# Patient Record
Sex: FEMALE
[De-identification: names, ages, dates, MRNs, and addresses within clinical notes are randomized per-mention and may not be internally consistent; named-entity substitution may affect disease eponyms.]

---

## 2019-10-11 PROBLEM — Z00.00 ENCOUNTER FOR PREVENTIVE HEALTH EXAMINATION: Status: ACTIVE | Noted: 2019-10-11

## 2019-10-14 ENCOUNTER — APPOINTMENT (OUTPATIENT)
Dept: OTOLARYNGOLOGY | Facility: CLINIC | Age: 79
End: 2019-10-14
Payer: MEDICARE

## 2019-10-14 DIAGNOSIS — Z86.79 PERSONAL HISTORY OF OTHER DISEASES OF THE CIRCULATORY SYSTEM: ICD-10-CM

## 2019-10-14 DIAGNOSIS — Z87.39 PERSONAL HISTORY OF OTHER DISEASES OF THE MUSCULOSKELETAL SYSTEM AND CONNECTIVE TISSUE: ICD-10-CM

## 2019-10-14 DIAGNOSIS — Z80.9 FAMILY HISTORY OF MALIGNANT NEOPLASM, UNSPECIFIED: ICD-10-CM

## 2019-10-14 DIAGNOSIS — H90.3 SENSORINEURAL HEARING LOSS, BILATERAL: ICD-10-CM

## 2019-10-14 PROCEDURE — 92567 TYMPANOMETRY: CPT

## 2019-10-14 PROCEDURE — G0268 REMOVAL OF IMPACTED WAX MD: CPT

## 2019-10-14 PROCEDURE — 92557 COMPREHENSIVE HEARING TEST: CPT

## 2019-10-14 PROCEDURE — 99202 OFFICE O/P NEW SF 15 MIN: CPT | Mod: 25

## 2019-10-14 RX ORDER — CHROMIUM 200 MCG
TABLET ORAL
Refills: 0 | Status: ACTIVE | COMMUNITY

## 2019-10-15 NOTE — HISTORY OF PRESENT ILLNESS
[de-identified] : This is an initial office visit for this woman who presents for evaluation of cerumen impaction. This was noticed on routine evaluation.\par She was referred in consultation.\par She also reports having an audiogram approximately one year ago. She reports that the audiologist/ told her that she was a borderline candidate for hearing aid amplification.\par \par She does intermittently have bilateral tinnitus.\par She does find that in some communicative environments especially with her  and within the ambiant noise her hearing is not as acute as it should be.

## 2019-10-15 NOTE — CONSULT LETTER
[Dear  ___] : Dear  [unfilled], [Consult Letter:] : I had the pleasure of evaluating your patient, [unfilled]. [Please see my note below.] : Please see my note below. [Sincerely,] : Sincerely, [FreeTextEntry1] : Enclosed- audiogram. [FreeTextEntry3] : Rafy Cotton MD\par [facility]- Co-Director\par [facility]

## 2019-10-15 NOTE — ASSESSMENT
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  She will have a hearing aide consultation at our hearing center.\par -The patient was asked to call and return urgently if any problems\par -I will see the patient in follow up one year.

## 2020-02-12 ENCOUNTER — APPOINTMENT (OUTPATIENT)
Dept: OTOLARYNGOLOGY | Facility: CLINIC | Age: 80
End: 2020-02-12
Payer: SELF-PAY

## 2020-02-12 PROCEDURE — SI001: CPT

## 2020-02-12 PROCEDURE — D0088: CPT

## 2020-02-12 PROCEDURE — D0090A COSMETIC BOTOX: CUSTOM

## 2020-02-12 RX ORDER — PNV NO.95/FERROUS FUM/FOLIC AC 28MG-0.8MG
TABLET ORAL
Refills: 0 | Status: ACTIVE | COMMUNITY

## 2020-02-12 RX ORDER — FUROSEMIDE 80 MG/1
TABLET ORAL
Refills: 0 | Status: ACTIVE | COMMUNITY

## 2020-02-12 RX ORDER — ENALAPRIL MALEATE 5 MG/1
TABLET ORAL
Refills: 0 | Status: ACTIVE | COMMUNITY

## 2020-02-12 RX ORDER — CHOLECALCIFEROL (VITAMIN D3) 25 MCG
TABLET ORAL
Refills: 0 | Status: ACTIVE | COMMUNITY

## 2020-02-12 RX ORDER — ASPIRIN 325 MG/1
TABLET, FILM COATED ORAL
Refills: 0 | Status: ACTIVE | COMMUNITY

## 2020-02-12 RX ORDER — BUPROPION HYDROCHLORIDE 100 MG/1
TABLET, FILM COATED ORAL
Refills: 0 | Status: ACTIVE | COMMUNITY

## 2020-02-12 RX ORDER — METOPROLOL TARTRATE 75 MG/1
TABLET, FILM COATED ORAL
Refills: 0 | Status: ACTIVE | COMMUNITY

## 2020-02-12 RX ORDER — ESCITALOPRAM OXALATE 5 MG/1
TABLET, FILM COATED ORAL
Refills: 0 | Status: ACTIVE | COMMUNITY

## 2020-02-12 RX ORDER — ATORVASTATIN CALCIUM 80 MG/1
TABLET, FILM COATED ORAL
Refills: 0 | Status: ACTIVE | COMMUNITY

## 2020-02-12 RX ORDER — TRETINOIN 0.25 MG/G
0.03 CREAM TOPICAL
Qty: 1 | Refills: 3 | Status: ACTIVE | COMMUNITY
Start: 2020-02-12 | End: 1900-01-01

## 2020-02-12 RX ORDER — CHROMIUM 200 MCG
TABLET ORAL
Refills: 0 | Status: ACTIVE | COMMUNITY

## 2021-06-15 ENCOUNTER — APPOINTMENT (OUTPATIENT)
Dept: OTOLARYNGOLOGY | Facility: CLINIC | Age: 81
End: 2021-06-15
Payer: MEDICARE

## 2021-06-15 VITALS — HEIGHT: 64 IN | WEIGHT: 143 LBS | TEMPERATURE: 97.2 F | BODY MASS INDEX: 24.41 KG/M2

## 2021-06-15 DIAGNOSIS — H90.5 UNSPECIFIED SENSORINEURAL HEARING LOSS: ICD-10-CM

## 2021-06-15 DIAGNOSIS — H61.20 IMPACTED CERUMEN, UNSPECIFIED EAR: ICD-10-CM

## 2021-06-15 PROCEDURE — 99212 OFFICE O/P EST SF 10 MIN: CPT | Mod: 25

## 2021-06-15 PROCEDURE — 69210 REMOVE IMPACTED EAR WAX UNI: CPT

## 2021-06-15 NOTE — HISTORY OF PRESENT ILLNESS
[de-identified] : History of recurrent bilateral cerumen impaction.\par Complaining of bilateral ear congestion.

## 2021-06-16 ENCOUNTER — APPOINTMENT (OUTPATIENT)
Dept: OTOLARYNGOLOGY | Facility: CLINIC | Age: 81
End: 2021-06-16
Payer: SELF-PAY

## 2021-06-16 VITALS — HEIGHT: 64 IN | TEMPERATURE: 97.6 F | WEIGHT: 143 LBS | BODY MASS INDEX: 24.41 KG/M2

## 2021-06-16 DIAGNOSIS — L98.8 OTHER SPECIFIED DISORDERS OF THE SKIN AND SUBCUTANEOUS TISSUE: ICD-10-CM

## 2021-06-16 PROCEDURE — D0090A COSMETIC BOTOX: CUSTOM

## 2021-06-16 PROCEDURE — D0088: CPT

## 2021-06-16 RX ORDER — ESCITALOPRAM OXALATE 10 MG/1
10 TABLET ORAL
Qty: 30 | Refills: 0 | Status: ACTIVE | COMMUNITY
Start: 2021-04-28

## 2021-06-16 RX ORDER — BUPROPION HYDROCHLORIDE 150 MG/1
150 TABLET, EXTENDED RELEASE ORAL
Qty: 90 | Refills: 0 | Status: ACTIVE | COMMUNITY
Start: 2020-11-30

## 2021-06-16 RX ORDER — RIVAROXABAN 20 MG/1
20 TABLET, FILM COATED ORAL
Qty: 30 | Refills: 0 | Status: ACTIVE | COMMUNITY
Start: 2021-04-15

## 2021-06-16 RX ORDER — METOPROLOL SUCCINATE 100 MG/1
100 TABLET, EXTENDED RELEASE ORAL
Qty: 90 | Refills: 0 | Status: ACTIVE | COMMUNITY
Start: 2021-02-16

## 2021-06-16 RX ORDER — NALTREXONE HYDROCHLORIDE 50 MG/1
50 TABLET, FILM COATED ORAL
Qty: 30 | Refills: 0 | Status: ACTIVE | COMMUNITY
Start: 2020-08-06

## 2021-06-23 RX ORDER — TRETINOIN 0.25 MG/G
0.03 CREAM TOPICAL
Qty: 1 | Refills: 3 | Status: ACTIVE | COMMUNITY
Start: 2021-06-23 | End: 1900-01-01

## 2022-12-14 ENCOUNTER — APPOINTMENT (OUTPATIENT)
Dept: OTOLARYNGOLOGY | Facility: CLINIC | Age: 82
End: 2022-12-14